# Patient Record
Sex: MALE | Race: BLACK OR AFRICAN AMERICAN | NOT HISPANIC OR LATINO | Employment: FULL TIME | URBAN - METROPOLITAN AREA
[De-identification: names, ages, dates, MRNs, and addresses within clinical notes are randomized per-mention and may not be internally consistent; named-entity substitution may affect disease eponyms.]

---

## 2019-01-19 ENCOUNTER — HOSPITAL ENCOUNTER (EMERGENCY)
Facility: MEDICAL CENTER | Age: 29
End: 2019-01-20
Attending: EMERGENCY MEDICINE
Payer: COMMERCIAL

## 2019-01-19 ENCOUNTER — APPOINTMENT (OUTPATIENT)
Dept: RADIOLOGY | Facility: MEDICAL CENTER | Age: 29
End: 2019-01-19
Attending: EMERGENCY MEDICINE
Payer: COMMERCIAL

## 2019-01-19 VITALS
HEIGHT: 74 IN | WEIGHT: 258.38 LBS | TEMPERATURE: 97.8 F | OXYGEN SATURATION: 98 % | HEART RATE: 63 BPM | SYSTOLIC BLOOD PRESSURE: 126 MMHG | DIASTOLIC BLOOD PRESSURE: 85 MMHG | BODY MASS INDEX: 33.16 KG/M2 | RESPIRATION RATE: 16 BRPM

## 2019-01-19 DIAGNOSIS — S61.210A LACERATION OF RIGHT INDEX FINGER WITHOUT FOREIGN BODY WITHOUT DAMAGE TO NAIL, INITIAL ENCOUNTER: ICD-10-CM

## 2019-01-19 DIAGNOSIS — S60.021A CONTUSION OF RIGHT INDEX FINGER WITHOUT DAMAGE TO NAIL, INITIAL ENCOUNTER: ICD-10-CM

## 2019-01-19 PROCEDURE — 90715 TDAP VACCINE 7 YRS/> IM: CPT | Performed by: EMERGENCY MEDICINE

## 2019-01-19 PROCEDURE — 304999 HCHG REPAIR-SIMPLE/INTERMED LEVEL 1

## 2019-01-19 PROCEDURE — A9270 NON-COVERED ITEM OR SERVICE: HCPCS | Performed by: EMERGENCY MEDICINE

## 2019-01-19 PROCEDURE — 303353 HCHG DERMABOND SKIN ADHESIVE

## 2019-01-19 PROCEDURE — 99284 EMERGENCY DEPT VISIT MOD MDM: CPT

## 2019-01-19 PROCEDURE — 90471 IMMUNIZATION ADMIN: CPT

## 2019-01-19 PROCEDURE — 73140 X-RAY EXAM OF FINGER(S): CPT | Mod: RT

## 2019-01-19 PROCEDURE — 700111 HCHG RX REV CODE 636 W/ 250 OVERRIDE (IP): Performed by: EMERGENCY MEDICINE

## 2019-01-19 PROCEDURE — 700102 HCHG RX REV CODE 250 W/ 637 OVERRIDE(OP): Performed by: EMERGENCY MEDICINE

## 2019-01-19 PROCEDURE — 302874 HCHG BANDAGE ACE 2 OR 3""

## 2019-01-19 RX ORDER — ACETAMINOPHEN 325 MG/1
650 TABLET ORAL ONCE
Status: COMPLETED | OUTPATIENT
Start: 2019-01-19 | End: 2019-01-19

## 2019-01-19 RX ADMIN — ACETAMINOPHEN 650 MG: 325 TABLET, FILM COATED ORAL at 22:42

## 2019-01-19 RX ADMIN — CLOSTRIDIUM TETANI TOXOID ANTIGEN (FORMALDEHYDE INACTIVATED), CORYNEBACTERIUM DIPHTHERIAE TOXOID ANTIGEN (FORMALDEHYDE INACTIVATED), BORDETELLA PERTUSSIS TOXOID ANTIGEN (GLUTARALDEHYDE INACTIVATED), BORDETELLA PERTUSSIS FILAMENTOUS HEMAGGLUTININ ANTIGEN (FORMALDEHYDE INACTIVATED), BORDETELLA PERTUSSIS PERTACTIN ANTIGEN, AND BORDETELLA PERTUSSIS FIMBRIAE 2/3 ANTIGEN 0.5 ML: 5; 2; 2.5; 5; 3; 5 INJECTION, SUSPENSION INTRAMUSCULAR at 22:42

## 2019-01-19 NOTE — LETTER
"  FORM C-4:  EMPLOYEE’S CLAIM FOR COMPENSATION/ REPORT OF INITIAL TREATMENT  EMPLOYEE’S CLAIM - PROVIDE ALL INFORMATION REQUESTED   First Name  Hason Last Name  Iman Birthdate             Age  1990 28 y.o. 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 [[address]] Zip  [address]   Date of Injury  1/19/2019       Hour of Injury  9:30 PM Date Employer Notified  1/19/2019 Last Day of Work after Injury or Occupational Disease  1/19/2019 Supervisor to Whom Injury Reported  HARRY   Address or Location of Accident (if applicable)     What were you doing at the time of accident? (if applicable)  WORKING    How did this injury or occupational disease occur? Be specific and answer in detail. Use additional sheet if necessary)  FINGER HAND GOT SLAMMED INTO BATTERY CELL AS i WAS PUSHING IT.   If you believe that you have an occupational disease, when did you first have knowledge of the disability and it relationship to your employment?  N/A Witnesses to the Accident  M/A     Nature of Injury or Occupational Disease  Laceration  Part(s) of Body Injured or Affected  Hand (R), Finger (R), Finger (R)    I certify that the above is true and correct to the best of my knowledge and that I have provided this information in order to obtain the benefits of [place] " [place]  I hereby authorize any physician, chiropractor, surgeon, practitioner, or other person, any hospital, including [facility] hospital, any medical service organization, any insurance company, or other institution or organization to release to each other, any medical or other information, including benefits paid or payable, pertinent to this injury or disease, except information relative to diagnosis, treatment and/or counseling for AIDS, psychological conditions, alcohol or controlled substances, for which I must give specific authorization.  A Photostat of this authorization shall be as valid as the original.   Date  01/20/2019 Place  [facility] Employee’s Signature   THIS REPORT MUST BE COMPLETED AND MAILED WITHIN 3 WORKING DAYS OF TREATMENT   Place  [facility], EMERGENCY DEPT  Name of Facility   [facility]   Date  1/19/2019 Diagnosis  (S61.210A) Laceration of right index finger without foreign body without damage to nail, initial encounter, Acute  (S60.021A) Contusion of right index finger without damage to nail, initial encounter, Acute Is there evidence the injured employee was under the influence of alcohol and/or another controlled substance at the time of accident?   Hour  12:12 AM Description of Injury or Disease  Laceration of right index finger without foreign body without damage to nail, initial encounter  Contusion of right index finger without damage to nail, initial encounter No   Treatment  Laceration repair  Have you advised the patient to remain off work five days or more?         No   X-Ray Findings  Negative   If Yes   From Date    To Date      From information given by the employee, together with medical evidence, can you directly connect this injury or occupational disease as job incurred?  Yes If No, is the employee capable of: Full Duty  No Modified Duty  Yes    "  Is additional medical care by a physician indicated?  Yes  Comments:Please follow-up with occupational health If Modified Duty, Specify any Limitations / Restrictions  Limited use of right hand     Do you know of any previous injury or disease contributing to this condition or occupational disease?  No   Date  1/20/2019 Print Doctor’s Name  Beverley Alcantar certify the employer’s copy of this form was mailed on:   Address  [address] [place] [id number] [phone] Insurer’s Use Only   [facility]  [id number]    Provider’s Tax ID Number  [id number] Telephone  Dept: [phone]    Doctor’s Signature  e-BEVERLEY Archibald D.O. Degree   MD    Original - TREATING PHYSICIAN OR CHIROPRACTOR   Pg 2-Insurer/TPA   Pg 3-Employer   Pg 4-Employee                                                                                                  Form C-4 (rev01/03)     BRIEF DESCRIPTION OF RIGHTS AND BENEFITS  (Pursuant to [place]RS 616C.050)    Notice of Injury or Occupational Disease (Incident Report Form C-1): If an injury or occupational disease (OD) arises out of and in the course of employment, you must provide written notice to your employer as soon as practicable, but no later than 7 days after the accident or OD. Your employer shall maintain a sufficient supply of the required forms.    Claim for Compensation (Form C-4): If medical treatment is sought, the form C-4 is available at the place of initial treatment. A completed \"Claim for Compensation\" (Form C-4) must be filed within 90 days after an accident or OD. The treating physician or chiropractor must, within 3 working days after treatment, complete and mail to the employer, the employer's insurer and third-party , the Claim for Compensation.    Medical Treatment: If you require medical treatment for your on-the-job injury or OD, you may be required to select a physician or chiropractor from a list provided by your workers’ compensation " insurer, if it has contracted with an Organization for Managed Care (MCO) or Preferred Provider Organization (PPO) or providers of health care. If your employer has not entered into a contract with an MCO or PPO, you may select a physician or chiropractor from the Panel of Physicians and Chiropractors. Any medical costs related to your industrial injury or OD will be paid by your insurer.    Temporary Total Disability (TTD): If your doctor has certified that you are unable to work for a period of at least 5 consecutive days, or 5 cumulative days in a 20-day period, or places restrictions on you that your employer does not accommodate, you may be entitled to TTD compensation.    Temporary Partial Disability (TPD): If the wage you receive upon reemployment is less than the compensation for TTD to which you are entitled, the insurer may be required to pay you TPD compensation to make up the difference. TPD can only be paid for a maximum of 24 months.    Permanent Partial Disability (PPD): When your medical condition is stable and there is an indication of a PPD as a result of your injury or OD, within 30 days, your insurer must arrange for an evaluation by a rating physician or chiropractor to determine the degree of your PPD. The amount of your PPD award depends on the date of injury, the results of the PPD evaluation and your age and wage.    Permanent Total Disability (PTD): If you are medically certified by a treating physician or chiropractor as permanently and totally disabled and have been granted a PTD status by your insurer, you are entitled to receive monthly benefits not to exceed 66 2/3% of your average monthly wage. The amount of your PTD payments is subject to reduction if you previously received a PPD award.    Vocational Rehabilitation Services: You may be eligible for vocational rehabilitation services if you are unable to return to the job due to a permanent physical impairment or permanent  restrictions as a result of your injury or occupational disease.    Transportation and Per Pancho Reimbursement: You may be eligible for travel expenses and per pancho associated with medical treatment.  Reopening: You may be able to reopen your claim if your condition worsens after claim closure.    Appeal Process: If you disagree with a written determination issued by the insurer or the insurer does not respond to your request, you may appeal to the Department of Administration, , by following the instructions contained in your determination letter. You must appeal the determination within 70 days from the date of the determination letter at [address], [address], [place], [place] [address], or [address], [address], [place], [place] [address]. If you disagree with the  decision, you may appeal to the Department of Administration, . You must file your appeal within 30 days from the date of the  decision letter at [address], [address], [place], [place] [address], or [address], [address], [place], [place] [address]. If you disagree with a decision of an , you may file a petition for judicial review with the District Court. You must do so within 30 days of the Appeal Officer’s decision. You may be represented by an  at your own expense or you may contact the [facility] for possible representation.    [place]  for Injured Workers (NAIW): If you disagree with a  decision, you may request that NAIW represent you without charge at an  Hearing. For information regarding denial of benefits, you may contact the [facility] at: [address], [address], [place] [address], [phone], or [address], [address] [address], [phone]    To File a Complaint with the Division: If you wish to file a complaint with the  of the Division of  Industrial Relations (DIR), please contact the Workers’ Compensation Section, [address], [address], [place], [place] [address], telephone [phone]) [phone], or [address], [address], [place], [place] [address], telephone [phone].    For assistance with Workers’ Compensation Issues: you may contact the Office of the [facility] Consumer Health Assistance, [address] [place] [address], [address], [place], [place] [address], Toll Free 1-656.215.2926, Web site: http://govTP [employer].[facility].[place]., E-mail [email or web address].                                                                                                                                                                               __________________________________________________________________                                    _________________            Employee Name / Signature                                                                                                                            Date                                       D-2 (rev. 10/07)

## 2019-01-20 NOTE — ED TRIAGE NOTES
"Chief Complaint   Patient presents with   • Hand Laceration     Was working with machinery at [employer], had a large weight pushed into his finger, sustained approx 1 cm lac to R 2nd finger.      /79   Pulse 63   Temp 37.3 °C (99.1 °F) (Temporal)   Resp 16   Ht 1.88 m (6' 2\")   Wt 117.2 kg (258 lb 6.1 oz)   SpO2 98%   BMI 33.17 kg/m²     Pt ambulatory to triage for above, steady on feet. Bleeding controlled at this time, dressing re-applied in triage. Pt thinks he has had a tetanus shot in the last 5 years but is not sure. Returned to [facility], instructed to notify staff of worsening concerns.   "

## 2019-01-20 NOTE — ED PROVIDER NOTES
"ED Provider Note    CHIEF COMPLAINT  Chief Complaint   Patient presents with   • Hand Laceration     Was working with machinery at [employer], had a large weight pushed into his finger, sustained approx 1 cm lac to R 2nd finger.         HPI    Primary care provider: No primary care provider on file.   History obtained from: Patient  History limited by: None     Nancy Valerio is a 28 y.o. male who presents to the ED complaining of injury to right index finger while working at [employer] shortly prior to arrival when there was a large weight accidentally pushed onto his finger.  He denies any other injuries except to the right index finger.  He is left-hand dominant.  Patient is unsure when he last had a tetanus shot.  He denies any weakness or sensory change.    REVIEW OF SYSTEMS  Please see HPI for pertinent positives/negatives.     PAST MEDICAL HISTORY  No past medical history on file.     SURGICAL HISTORY  History reviewed. No pertinent surgical history.     SOCIAL HISTORY  Social History     Social History Main Topics   • Smoking status: Never Smoker   • Smokeless tobacco: Never Used   • Alcohol use Yes      Comment: socially   • Drug use: No   • Sexual activity: Not on file        FAMILY HISTORY  History reviewed. No pertinent family history.     CURRENT MEDICATIONS  Home Medications    **Home medications have not yet been reviewed for this encounter**          ALLERGIES  No Known Allergies     PHYSICAL EXAM  VITAL SIGNS: /85   Pulse 63   Temp 36.6 °C (97.8 °F) (Temporal)   Resp 16   Ht 1.88 m (6' 2\")   Wt 117.2 kg (258 lb 6.1 oz)   SpO2 98%   BMI 33.17 kg/m²  @MARK[[id number]::@     Pulse ox interpretation: 98% I interpret this pulse ox as normal     Constitutional: Well developed, well nourished, alert in no apparent distress, nontoxic appearance   HENT: No external signs of trauma, normocephalic   Eyes: No discharge, no icterus   Neck: Trachea midline, no stridor   Cardiovascular: Strong distal pulses and good " perfusion   Thorax & Lungs: No respiratory distress   Extremities: No cyanosis, no edema, no gross deformity, good ROM, laceration to flexor surface of right index finger distal phalanx without gross deformity/swelling, mild ecchymosis and tenderness to palpation, brisk distal cap refill, sensation intact to touch throughout, 5/5 strength including against resistance  Skin: Warm, dry, no pallor/cyanosis, no rash noted   Neuro: A/O times 3, no focal deficits noted   Psychiatric: Cooperative, normal mood and affect, normal judgement, appropriate for clinical situation          DIAGNOSTIC STUDIES / PROCEDURES    Verbal consent was obtained for laceration repair.  The wound was irrigated with NS.  Wound was explored without evidence of FB.  The laceration was closed using Dermabond.  Pt tolerated procedure well without complications.  Pt instructed on S/S of infection.    Total repaired wound length: 2 cm.      Tetanus updated        LABS  All labs reviewed by me.     No results found for this or any previous visit.     RADIOLOGY  The radiologist's interpretation of all radiological studies have been reviewed by me.     DX-FINGER(S) 2+ RIGHT   Final Result      No foreign body or displaced fracture is detected             COURSE & MEDICAL DECISION MAKING  Nursing notes, VS, PMSFHx reviewed in chart.     Review of past medical records shows the patient without prior visits to this ED.      Differential diagnoses considered include but are not limited to: Fx, dislocation, laceration, contusion, strain, sprain, neurovascular injury       History and physical exam as above.  X-rays of the right index finger without evidence of acute bony injury.  Patient's tetanus was updated.  He has a superficial flap wound to his right index finger that was cleaned and closed using Dermabond without complication.  No foreign body identified and no obvious signs of tendon or joint involvement.  Discussed with patient monitoring for signs  and symptoms of infection and return to ED precautions were given.  He was advised to follow-up with occupational health.  Patient otherwise noted to be in no acute distress and nontoxic in appearance.  He verbalized understanding and agreed with plan of care with no further questions or concerns.      The patient is referred to a primary physician for blood pressure management, diabetic screening, and for all other preventative health concerns.       FINAL IMPRESSION  1. Laceration of right index finger without foreign body without damage to nail, initial encounter Acute   2. Contusion of right index finger without damage to nail, initial encounter Acute          DISPOSITION  Patient will be discharged home in stable condition.       FOLLOW UP  [facility]  [address] [place] [address]  [phone]    In 1 day      [facility], Emergency Dept  [address] [facility] [address] [address] [phone]    If symptoms worsen         OUTPATIENT MEDICATIONS  There are no discharge medications for this patient.         Electronically signed by: Yannick Alcantar, 1/19/2019 10:31 PM      Portions of this record were made with voice recognition software.  Despite my review, spelling/grammar/context errors may still remain.  Interpretation of this chart should be taken in this context.

## 2021-02-01 ENCOUNTER — HOSPITAL ENCOUNTER (OUTPATIENT)
Facility: MEDICAL CENTER | Age: 31
End: 2021-02-01
Attending: FAMILY MEDICINE
Payer: COMMERCIAL

## 2021-02-01 ENCOUNTER — OFFICE VISIT (OUTPATIENT)
Dept: URGENT CARE | Facility: PHYSICIAN GROUP | Age: 31
End: 2021-02-01
Payer: COMMERCIAL

## 2021-02-01 VITALS
TEMPERATURE: 98.4 F | WEIGHT: 229.2 LBS | DIASTOLIC BLOOD PRESSURE: 78 MMHG | BODY MASS INDEX: 28.5 KG/M2 | HEART RATE: 64 BPM | OXYGEN SATURATION: 98 % | SYSTOLIC BLOOD PRESSURE: 122 MMHG | RESPIRATION RATE: 12 BRPM | HEIGHT: 75 IN

## 2021-02-01 DIAGNOSIS — J98.8 RTI (RESPIRATORY TRACT INFECTION): ICD-10-CM

## 2021-02-01 LAB
INT CON NEG: NEGATIVE
INT CON POS: POSITIVE
S PYO AG THROAT QL: NORMAL

## 2021-02-01 PROCEDURE — 87880 STREP A ASSAY W/OPTIC: CPT | Performed by: FAMILY MEDICINE

## 2021-02-01 PROCEDURE — U0003 INFECTIOUS AGENT DETECTION BY NUCLEIC ACID (DNA OR RNA); SEVERE ACUTE RESPIRATORY SYNDROME CORONAVIRUS 2 (SARS-COV-2) (CORONAVIRUS DISEASE [COVID-19]), AMPLIFIED PROBE TECHNIQUE, MAKING USE OF HIGH THROUGHPUT TECHNOLOGIES AS DESCRIBED BY CMS-2020-01-R: HCPCS

## 2021-02-01 PROCEDURE — U0005 INFEC AGEN DETEC AMPLI PROBE: HCPCS

## 2021-02-01 PROCEDURE — 99202 OFFICE O/P NEW SF 15 MIN: CPT | Performed by: FAMILY MEDICINE

## 2021-02-01 ASSESSMENT — ENCOUNTER SYMPTOMS
SORE THROAT: 1
HEADACHES: 1

## 2021-02-01 NOTE — PROGRESS NOTES
"Subjective:      Nancy Valerio is a 30 y.o. male who presents with Pharyngitis (exp to COVID, no smell or taste, sore throat, previous fever, headache)    - This is a pleasant and nontoxic appearing 30 y.o. male with c/o ~6 days ago had some headaches, malaise, subjective fever. Has improved but 2 days ago has lost his sense of smell and taste so came in to get tested for covid-19. No cp/sob or fever in past 2-3 days            ALLERGIES:  Patient has no known allergies.     PMH:  History reviewed. No pertinent past medical history.     PSH:  History reviewed. No pertinent surgical history.    MEDS:  No current outpatient medications on file.    ** I have documented what I find to be significant in regards to past medical, social, family and surgical history  in my HPI or under PMH/PSH/FH review section, otherwise it is noncontributory **             HPI    Review of Systems   Constitutional: Positive for malaise/fatigue.   HENT: Positive for sore throat.    Neurological: Positive for headaches.   All other systems reviewed and are negative.         Objective:     /78 (BP Location: Left arm, Patient Position: Sitting, BP Cuff Size: Adult)   Pulse 64   Temp 36.9 °C (98.4 °F) (Temporal)   Resp 12   Ht 1.905 m (6' 3\")   Wt 104 kg (229 lb 3.2 oz)   SpO2 98%   BMI 28.65 kg/m²      Physical Exam  Vitals signs and nursing note reviewed.   Constitutional:       General: He is not in acute distress.     Appearance: He is well-developed. He is not diaphoretic.   HENT:      Head: Normocephalic and atraumatic.      Mouth/Throat:      Mouth: Mucous membranes are moist.      Pharynx: Oropharynx is clear.   Eyes:      General: No scleral icterus.     Conjunctiva/sclera: Conjunctivae normal.   Cardiovascular:      Heart sounds: Normal heart sounds. No murmur.   Pulmonary:      Effort: Pulmonary effort is normal. No respiratory distress.      Breath sounds: Normal breath sounds.   Skin:     Coloration: Skin is not pale. "      Findings: No rash.   Neurological:      Mental Status: He is alert.      Motor: No abnormal muscle tone.   Psychiatric:         Mood and Affect: Mood normal.         Behavior: Behavior normal.         Judgment: Judgment normal.                 Assessment/Plan:            1. RTI (respiratory tract infection)  POCT Rapid Strep A    COVID/SARS CoV-2 PCR         - Dx, plan & d/c instructions discussed w/ patient and/or family members  - Rest, stay hydrated OTC Motrin and/or Tylenol as needed  - E.R. precautions discussed       Follow up with their PCP in 2-3 days strongly advised, ER if not improving or feeling/getting worse.    Any realistic side effects of medications that may have been given today (i.e. Rash, GI upset/constipation, sedation, elevation of BP or blood sugars) discussed.     Patient left in stable condition      reviewed if narcotics given

## 2021-02-02 LAB
COVID ORDER STATUS COVID19: NORMAL
SARS-COV-2 RNA RESP QL NAA+PROBE: DETECTED
SPECIMEN SOURCE: ABNORMAL

## 2021-03-22 ENCOUNTER — APPOINTMENT (OUTPATIENT)
Dept: URGENT CARE | Facility: PHYSICIAN GROUP | Age: 31
End: 2021-03-22

## 2021-05-17 LAB — EKG IMPRESSION: NORMAL

## 2021-05-17 PROCEDURE — 302449 STATCHG TRIAGE ONLY (STATISTIC)

## 2021-05-17 PROCEDURE — 93005 ELECTROCARDIOGRAM TRACING: CPT

## 2021-05-18 ENCOUNTER — HOSPITAL ENCOUNTER (EMERGENCY)
Facility: MEDICAL CENTER | Age: 31
End: 2021-05-18
Payer: COMMERCIAL

## 2021-05-18 VITALS
SYSTOLIC BLOOD PRESSURE: 132 MMHG | BODY MASS INDEX: 28.75 KG/M2 | RESPIRATION RATE: 14 BRPM | TEMPERATURE: 97.5 F | HEART RATE: 90 BPM | WEIGHT: 230 LBS | DIASTOLIC BLOOD PRESSURE: 81 MMHG | OXYGEN SATURATION: 94 %

## 2021-05-18 NOTE — ED NOTES
Attempted to call pt to place in ED room and pt not seen in lobby or outside ED entrance at this time. Will reattempt shortly

## 2021-05-18 NOTE — ED TRIAGE NOTES
Nancy Valerio   30 y.o. male   Chief Complaint   Patient presents with   • Shortness of Breath      The patient presents to the ED via EMS to triage for evaluation of shortness of breath and a case of the hiccups. The patient reports that he was smoking marijuana when he suddenly developed the above mentioned symptoms. The patient reports that the symptoms resolved en route to the department. He states that he has a slight residual headache. Denies any other symptoms at this time.     The patient denies knowingly being around anyone with suspected or confirmed COVID 19.    /81   Pulse 90   Temp 36.4 °C (97.5 °F) (Temporal)   Resp 14   Wt 104 kg (230 lb)   SpO2 94%   BMI 28.75 kg/m²

## 2021-08-30 ENCOUNTER — APPOINTMENT (OUTPATIENT)
Dept: URGENT CARE | Facility: CLINIC | Age: 31
End: 2021-08-30
Payer: COMMERCIAL

## 2021-09-16 ENCOUNTER — HOSPITAL ENCOUNTER (EMERGENCY)
Facility: MEDICAL CENTER | Age: 31
End: 2021-09-16
Payer: COMMERCIAL

## 2021-09-16 VITALS
WEIGHT: 268.74 LBS | TEMPERATURE: 98.5 F | HEART RATE: 88 BPM | HEIGHT: 75 IN | DIASTOLIC BLOOD PRESSURE: 91 MMHG | BODY MASS INDEX: 33.41 KG/M2 | OXYGEN SATURATION: 98 % | SYSTOLIC BLOOD PRESSURE: 145 MMHG | RESPIRATION RATE: 16 BRPM

## 2021-09-16 PROCEDURE — 302449 STATCHG TRIAGE ONLY (STATISTIC)

## 2021-09-16 NOTE — ED TRIAGE NOTES
Nancy [facility]  30 y.o.  male  Chief Complaint   Patient presents with   • Drug Ingestion     Pt took LSD for the first time at 9PM (as well as a small amount of marijuana), had no effects so took a second tab at 11PM, started having visual hallucinations a few hours ago & they have since escalated to be ++ frightening. Tried taking a Nyquil to sleep with no relief. Pt denies any SI/HI, just afraid at this time to close his eyes. Pt calm, cooperative.        Patient educated on triage process, to alert staff if any changes in condition.

## 2021-09-16 NOTE — ED NOTES
Pt is heard calling an uber and getting into one. Did not notify staff of departure. Pt did not sign AMA form.

## 2022-01-31 ENCOUNTER — TELEPHONE (OUTPATIENT)
Dept: SCHEDULING | Facility: IMAGING CENTER | Age: 32
End: 2022-01-31

## 2022-02-16 ENCOUNTER — HOSPITAL ENCOUNTER (EMERGENCY)
Facility: MEDICAL CENTER | Age: 32
End: 2022-02-16
Payer: COMMERCIAL

## 2022-02-16 VITALS
SYSTOLIC BLOOD PRESSURE: 124 MMHG | HEART RATE: 64 BPM | DIASTOLIC BLOOD PRESSURE: 85 MMHG | WEIGHT: 227 LBS | HEIGHT: 75 IN | TEMPERATURE: 97.4 F | BODY MASS INDEX: 28.23 KG/M2 | RESPIRATION RATE: 20 BRPM | OXYGEN SATURATION: 97 %

## 2022-02-16 PROCEDURE — A9270 NON-COVERED ITEM OR SERVICE: HCPCS | Performed by: EMERGENCY MEDICINE

## 2022-02-16 PROCEDURE — 99283 EMERGENCY DEPT VISIT LOW MDM: CPT

## 2022-02-16 PROCEDURE — 700102 HCHG RX REV CODE 250 W/ 637 OVERRIDE(OP): Performed by: EMERGENCY MEDICINE

## 2022-02-16 RX ORDER — BENZOCAINE/MENTHOL 6 MG-10 MG
LOZENGE MUCOUS MEMBRANE 2 TIMES DAILY
Status: DISCONTINUED | OUTPATIENT
Start: 2022-02-16 | End: 2022-02-16

## 2022-02-16 RX ORDER — NYSTATIN 100000 [USP'U]/G
POWDER TOPICAL 2 TIMES DAILY
Status: DISCONTINUED | OUTPATIENT
Start: 2022-02-16 | End: 2022-02-16

## 2022-02-16 RX ORDER — NYSTATIN 100000 [USP'U]/G
POWDER TOPICAL 2 TIMES DAILY
Status: COMPLETED | OUTPATIENT
Start: 2022-02-16 | End: 2022-02-16

## 2022-02-16 RX ORDER — BENZOCAINE/MENTHOL 6 MG-10 MG
LOZENGE MUCOUS MEMBRANE ONCE
Status: COMPLETED | OUTPATIENT
Start: 2022-02-16 | End: 2022-02-16

## 2022-02-16 RX ADMIN — NYSTATIN: 100000 POWDER TOPICAL at 03:45

## 2022-02-16 RX ADMIN — HYDROCORTISONE: 10 CREAM TOPICAL at 03:30

## 2022-02-16 NOTE — ED PROVIDER NOTES
ER Provider Note     Scribed for No att. providers found by Kia Ng. 2/16/2022, 3:48 AM.    Primary Care Provider: No primary care provider.  Means of Arrival: Walk in    History obtained from: Patient  History limited by: None     CHIEF COMPLAINT  Chief Complaint   Patient presents with    Rash       HPI  Nancy Valerio is a 31 y.o. male who presents to the Emergency Department for evaluation of ankle and buttock rash. The patient reports that he first noticed the rash on his buttock on Friday while at work ([employer]). He realized that he was scratching his buttock area more than usual. As the days progressed, the rash became more itchy and irritated. Nancy rates the buttock rash pain a 6/10. The patient denies using any new perfume, detergent, soap, or shampoo. He has also not used cream or lotion on his buttock, however he applied rubbing alcohol in hopes of drying out the rash. But notes that this may have exacerbated it. He also tried soaking in bath water, containing dish detergent, to alleviate his buttock rash with no alleviation. A day before yesterday, the patient then noticed a bump on his ankle. He notes that his ankle rash is also painful and itchy especially with pressure. He denies hematochezia or dysuria. No alleviating factors were reported. The patient has not had any harmful exposure of his buttock or ankle. Nancy has no history of skin illnesses. The patient is currently not sexually active. However, he was a few months ago with a female partner and notes that he did use protection most of the time. He has not had an STD check in the last six months. The patient reports that he stopped smoking marijuana a few months ago. He is not vaccinated for COVID.     REVIEW OF SYSTEMS  See HPI for further details.   Pertinent positives include buttock rash and ankle rash. Pertinent negatives include no hematochezia or dysuria.      PAST MEDICAL HISTORY  None noted.     SURGICAL HISTORY  patient denies any  "surgical history    SOCIAL HISTORY      Social History     Substance and Sexual Activity   Drug Use None noted       FAMILY HISTORY  History reviewed. No pertinent family history.    CURRENT MEDICATIONS  None noted.     ALLERGIES  None noted.     PHYSICAL EXAM  VITAL SIGNS: /85   Pulse 64   Temp 36.3 °C (97.4 °F)   Resp 20   Ht 1.905 m (6' 3\")   Wt 103 kg (227 lb)   SpO2 97%   BMI 28.37 kg/m²      Constitutional: Alert in no apparent distress.  HENT: Normocephalic, Atraumatic, Bilateral external ears normal. Nose normal. Moist mucus membranes.   Eyes: Pupils are equal and reactive. Conjunctiva normal, non-icteric.   Heart: Regular rate and rythm, no murmurs.    Lungs: Clear to auscultation bilaterally.  Skin: Rash between buttock. Peeling and white discharge. Warm, No erythema. Fluid filled bump on top of right foot.   Neurologic: Alert, Grossly non-focal.   Psychiatric: Affect normal, Judgment normal, Mood normal, Appears appropriate and not intoxicated.        COURSE & MEDICAL DECISION MAKING  Pertinent Labs & Imaging studies reviewed. (See chart for details)    This is a 31 y.o. male that presents with what appears to be a fungal infection in his gluteal fold as well as a unclear rash on the left toe.  We will give the patient hydrocortisone cream.  We will send him home with these prescriptions..     3:48 AM - Patient seen and examined at bedside.      The patient will return for new or worsening symptoms and is stable at the time of discharge.    The patient is referred to a primary physician for blood pressure management, diabetic screening, and for all other preventative health concerns.      DISPOSITION:  Patient will be discharged home in stable condition.    FOLLOW UP:  No follow-up provider specified.      FINAL IMPRESSION  No diagnosis found.      IKia (Derrick), am scribing for, and in the presence of, No att. providers found.    Electronically signed by: Kia Ng (Derrick), " 2/16/2022    I, No att. providers found personally performed the services described in this documentation, as scribed by Kia Ng in my presence, and it is both accurate and complete.     The note accurately reflects work and decisions made by me.  Fernando Marin M.D.  2/16/2022  5:20 AM

## 2022-02-22 ENCOUNTER — TELEPHONE (OUTPATIENT)
Dept: SCHEDULING | Facility: IMAGING CENTER | Age: 32
End: 2022-02-22

## 2022-02-24 ENCOUNTER — APPOINTMENT (OUTPATIENT)
Dept: RADIOLOGY | Facility: MEDICAL CENTER | Age: 32
End: 2022-02-24
Attending: EMERGENCY MEDICINE
Payer: COMMERCIAL

## 2022-02-24 ENCOUNTER — HOSPITAL ENCOUNTER (EMERGENCY)
Facility: MEDICAL CENTER | Age: 32
End: 2022-02-24
Attending: EMERGENCY MEDICINE
Payer: COMMERCIAL

## 2022-02-24 VITALS
BODY MASS INDEX: 34.34 KG/M2 | SYSTOLIC BLOOD PRESSURE: 145 MMHG | DIASTOLIC BLOOD PRESSURE: 82 MMHG | TEMPERATURE: 98.5 F | WEIGHT: 253.53 LBS | RESPIRATION RATE: 16 BRPM | HEART RATE: 100 BPM | HEIGHT: 72 IN | OXYGEN SATURATION: 99 %

## 2022-02-24 DIAGNOSIS — R10.31 RLQ ABDOMINAL PAIN: ICD-10-CM

## 2022-02-24 DIAGNOSIS — M54.50 ACUTE RIGHT-SIDED LOW BACK PAIN WITHOUT SCIATICA: ICD-10-CM

## 2022-02-24 LAB
ALBUMIN SERPL BCP-MCNC: 4.8 G/DL (ref 3.2–4.9)
ALBUMIN/GLOB SERPL: 1.2 G/DL
ALP SERPL-CCNC: 79 U/L (ref 30–99)
ALT SERPL-CCNC: 24 U/L (ref 2–50)
ANION GAP SERPL CALC-SCNC: 12 MMOL/L (ref 7–16)
APPEARANCE UR: CLEAR
AST SERPL-CCNC: 29 U/L (ref 12–45)
BASOPHILS # BLD AUTO: 0.3 % (ref 0–1.8)
BASOPHILS # BLD: 0.05 K/UL (ref 0–0.12)
BILIRUB SERPL-MCNC: 1.4 MG/DL (ref 0.1–1.5)
BILIRUB UR QL STRIP.AUTO: NEGATIVE
BUN SERPL-MCNC: 16 MG/DL (ref 8–22)
CALCIUM SERPL-MCNC: 9.9 MG/DL (ref 8.5–10.5)
CHLORIDE SERPL-SCNC: 98 MMOL/L (ref 96–112)
CO2 SERPL-SCNC: 24 MMOL/L (ref 20–33)
COLOR UR: ABNORMAL
CREAT SERPL-MCNC: 1.05 MG/DL (ref 0.5–1.4)
EOSINOPHIL # BLD AUTO: 0.02 K/UL (ref 0–0.51)
EOSINOPHIL NFR BLD: 0.1 % (ref 0–6.9)
ERYTHROCYTE [DISTWIDTH] IN BLOOD BY AUTOMATED COUNT: 36.2 FL (ref 35.9–50)
GLOBULIN SER CALC-MCNC: 4 G/DL (ref 1.9–3.5)
GLUCOSE SERPL-MCNC: 136 MG/DL (ref 65–99)
GLUCOSE UR STRIP.AUTO-MCNC: NEGATIVE MG/DL
HCT VFR BLD AUTO: 41.5 % (ref 42–52)
HGB BLD-MCNC: 15.4 G/DL (ref 14–18)
IMM GRANULOCYTES # BLD AUTO: 0.08 K/UL (ref 0–0.11)
IMM GRANULOCYTES NFR BLD AUTO: 0.5 % (ref 0–0.9)
KETONES UR STRIP.AUTO-MCNC: ABNORMAL MG/DL
LEUKOCYTE ESTERASE UR QL STRIP.AUTO: NEGATIVE
LIPASE SERPL-CCNC: 44 U/L (ref 11–82)
LYMPHOCYTES # BLD AUTO: 1.43 K/UL (ref 1–4.8)
LYMPHOCYTES NFR BLD: 8.1 % (ref 22–41)
MCH RBC QN AUTO: 29.1 PG (ref 27–33)
MCHC RBC AUTO-ENTMCNC: 37.1 G/DL (ref 33.7–35.3)
MCV RBC AUTO: 78.3 FL (ref 81.4–97.8)
MICRO URNS: ABNORMAL
MONOCYTES # BLD AUTO: 1.08 K/UL (ref 0–0.85)
MONOCYTES NFR BLD AUTO: 6.1 % (ref 0–13.4)
NEUTROPHILS # BLD AUTO: 14.97 K/UL (ref 1.82–7.42)
NEUTROPHILS NFR BLD: 84.9 % (ref 44–72)
NITRITE UR QL STRIP.AUTO: NEGATIVE
NRBC # BLD AUTO: 0 K/UL
NRBC BLD-RTO: 0 /100 WBC
PH UR STRIP.AUTO: 5 [PH] (ref 5–8)
PLATELET # BLD AUTO: 302 K/UL (ref 164–446)
PMV BLD AUTO: 11.2 FL (ref 9–12.9)
POTASSIUM SERPL-SCNC: 3.9 MMOL/L (ref 3.6–5.5)
PROT SERPL-MCNC: 8.8 G/DL (ref 6–8.2)
PROT UR QL STRIP: NEGATIVE MG/DL
RBC # BLD AUTO: 5.3 M/UL (ref 4.7–6.1)
RBC UR QL AUTO: NEGATIVE
SODIUM SERPL-SCNC: 134 MMOL/L (ref 135–145)
SP GR UR STRIP.AUTO: 1.03
UROBILINOGEN UR STRIP.AUTO-MCNC: 0.2 MG/DL
WBC # BLD AUTO: 17.6 K/UL (ref 4.8–10.8)

## 2022-02-24 PROCEDURE — 96374 THER/PROPH/DIAG INJ IV PUSH: CPT

## 2022-02-24 PROCEDURE — 83690 ASSAY OF LIPASE: CPT

## 2022-02-24 PROCEDURE — 80053 COMPREHEN METABOLIC PANEL: CPT

## 2022-02-24 PROCEDURE — 700111 HCHG RX REV CODE 636 W/ 250 OVERRIDE (IP): Performed by: EMERGENCY MEDICINE

## 2022-02-24 PROCEDURE — 74177 CT ABD & PELVIS W/CONTRAST: CPT

## 2022-02-24 PROCEDURE — 36415 COLL VENOUS BLD VENIPUNCTURE: CPT

## 2022-02-24 PROCEDURE — 99285 EMERGENCY DEPT VISIT HI MDM: CPT

## 2022-02-24 PROCEDURE — 700117 HCHG RX CONTRAST REV CODE 255: Performed by: EMERGENCY MEDICINE

## 2022-02-24 PROCEDURE — 85025 COMPLETE CBC W/AUTO DIFF WBC: CPT

## 2022-02-24 PROCEDURE — 81003 URINALYSIS AUTO W/O SCOPE: CPT

## 2022-02-24 PROCEDURE — 700105 HCHG RX REV CODE 258: Performed by: EMERGENCY MEDICINE

## 2022-02-24 RX ORDER — MORPHINE SULFATE 4 MG/ML
6 INJECTION INTRAVENOUS ONCE
Status: COMPLETED | OUTPATIENT
Start: 2022-02-24 | End: 2022-02-24

## 2022-02-24 RX ORDER — SODIUM CHLORIDE 9 MG/ML
1000 INJECTION, SOLUTION INTRAVENOUS ONCE
Status: COMPLETED | OUTPATIENT
Start: 2022-02-24 | End: 2022-02-24

## 2022-02-24 RX ADMIN — IOHEXOL 100 ML: 350 INJECTION, SOLUTION INTRAVENOUS at 11:20

## 2022-02-24 RX ADMIN — SODIUM CHLORIDE 1000 ML: 9 INJECTION, SOLUTION INTRAVENOUS at 11:14

## 2022-02-24 RX ADMIN — MORPHINE SULFATE 6 MG: 4 INJECTION INTRAVENOUS at 11:14

## 2022-02-24 ASSESSMENT — LIFESTYLE VARIABLES: DO YOU DRINK ALCOHOL: NO

## 2022-02-24 NOTE — ED TRIAGE NOTES
Pt amb to triage.  Chief Complaint   Patient presents with   • Head Ache   • RLQ Pain   • Flank Pain     Symptoms since last night. Also states he had a fever at home but was unable to check his temp. Protocol initiated.

## 2022-02-24 NOTE — ED PROVIDER NOTES
ED Provider Note    CHIEF COMPLAINT  Chief Complaint   Patient presents with   • Head Ache   • RLQ Pain   • Flank Pain       HPI  Nancy Valerio is a 31 y.o. male who presents for evaluation of right lower quadrant abdominal pain with decreased appetite.  He also reports having a headache.  This all began yesterday, states the pain seems to radiate to his low back on the right side.  No burning or blood with urination.  No vomiting, no diarrhea, he felt chills and subjective fever with this.  He has no medical problems.  He states that he does occasionally get headaches.  No head injuries.  Takes no medicine on a regular basis.  The patient reports he is specifically concerned about cancer after a Google search.    REVIEW OF SYSTEMS  Negative for rash, chest pain, dyspnea, vomiting, diarrhea, focal weakness, focal numbness, focal tingling. All other systems are negative.     PAST MEDICAL HISTORY  No past medical history on file.    FAMILY HISTORY  No family history on file.    SOCIAL HISTORY  Social History     Tobacco Use   • Smoking status: Never Smoker   • Smokeless tobacco: Never Used   Vaping Use   • Vaping Use: Never used   Substance Use Topics   • Alcohol use: Yes     Comment: socially   • Drug use: Not Currently     Comment: occasional marijuana       SURGICAL HISTORY  No past surgical history on file.    CURRENT MEDICATIONS  I personally reviewed the medication list in the charting documentation.     ALLERGIES  No Known Allergies    MEDICAL RECORD  I have reviewed patient's medical record and pertinent results are listed above.      PHYSICAL EXAM  VITAL SIGNS: /85   Pulse 100   Temp 37.2 °C (99 °F) (Temporal)   Resp 18   Ht 1.829 m (6')   Wt 115 kg (253 lb 8.5 oz)   SpO2 100%   BMI 34.38 kg/m²    Constitutional: Well appearing patient in no acute distress.  Awake and alert, not toxic nor ill in appearance.  HENT: Normocephalic, no obvious evidence of acute trauma.  Eyes: No scleral  icterus. Normal conjunctiva pupils are equal and reactive.  Neck: Comfortable movement without any obvious restriction in the range of motion.  Cardiovascular: Upon ascultation I appreciate a regular heart rhythm and a normal rate.   Thorax & Lungs: Normal nonlabored respirations.  Upon application of the stethoscope for auscultation I find there to be no associated chest wall tenderness.  I appreciate no wheezing, rhonchi or rales. There is normal air movement.    Abdomen: The abdomen is not visibly distended.  Upon palpation has mild focal tenderness in the right lower quadrant but no rebound, no guarding.  Skin: The exposed portions of skin reveal no obvious rash or other abnormalities.  Neurologic: Alert & oriented. No focal deficits observed.   Psychiatric: Normal affect appropriate for the clinical situation.    DIAGNOSTIC STUDIES / PROCEDURES    LABS/EKGs  Results for orders placed or performed during the hospital encounter of 02/24/22   CBC WITH DIFFERENTIAL   Result Value Ref Range    WBC 17.6 (H) 4.8 - 10.8 K/uL    RBC 5.30 4.70 - 6.10 M/uL    Hemoglobin 15.4 14.0 - 18.0 g/dL    Hematocrit 41.5 (L) 42.0 - 52.0 %    MCV 78.3 (L) 81.4 - 97.8 fL    MCH 29.1 27.0 - 33.0 pg    MCHC 37.1 (H) 33.7 - 35.3 g/dL    RDW 36.2 35.9 - 50.0 fL    Platelet Count 302 164 - 446 K/uL    MPV 11.2 9.0 - 12.9 fL    Neutrophils-Polys 84.90 (H) 44.00 - 72.00 %    Lymphocytes 8.10 (L) 22.00 - 41.00 %    Monocytes 6.10 0.00 - 13.40 %    Eosinophils 0.10 0.00 - 6.90 %    Basophils 0.30 0.00 - 1.80 %    Immature Granulocytes 0.50 0.00 - 0.90 %    Nucleated RBC 0.00 /100 WBC    Neutrophils (Absolute) 14.97 (H) 1.82 - 7.42 K/uL    Lymphs (Absolute) 1.43 1.00 - 4.80 K/uL    Monos (Absolute) 1.08 (H) 0.00 - 0.85 K/uL    Eos (Absolute) 0.02 0.00 - 0.51 K/uL    Baso (Absolute) 0.05 0.00 - 0.12 K/uL    Immature Granulocytes (abs) 0.08 0.00 - 0.11 K/uL    NRBC (Absolute) 0.00 K/uL   COMP METABOLIC PANEL   Result Value Ref Range    Sodium 134  (L) 135 - 145 mmol/L    Potassium 3.9 3.6 - 5.5 mmol/L    Chloride 98 96 - 112 mmol/L    Co2 24 20 - 33 mmol/L    Anion Gap 12.0 7.0 - 16.0    Glucose 136 (H) 65 - 99 mg/dL    Bun 16 8 - 22 mg/dL    Creatinine 1.05 0.50 - 1.40 mg/dL    Calcium 9.9 8.5 - 10.5 mg/dL    AST(SGOT) 29 12 - 45 U/L    ALT(SGPT) 24 2 - 50 U/L    Alkaline Phosphatase 79 30 - 99 U/L    Total Bilirubin 1.4 0.1 - 1.5 mg/dL    Albumin 4.8 3.2 - 4.9 g/dL    Total Protein 8.8 (H) 6.0 - 8.2 g/dL    Globulin 4.0 (H) 1.9 - 3.5 g/dL    A-G Ratio 1.2 g/dL   LIPASE   Result Value Ref Range    Lipase 44 11 - 82 U/L   URINALYSIS    Specimen: Urine   Result Value Ref Range    Color DK Yellow     Character Clear     Specific Gravity 1.033 <1.035    Ph 5.0 5.0 - 8.0    Glucose Negative Negative mg/dL    Ketones Trace (A) Negative mg/dL    Protein Negative Negative mg/dL    Bilirubin Negative Negative    Urobilinogen, Urine 0.2 Negative    Nitrite Negative Negative    Leukocyte Esterase Negative Negative    Occult Blood Negative Negative    Micro Urine Req see below    ESTIMATED GFR   Result Value Ref Range    GFR If African American >60 >60 mL/min/1.73 m 2    GFR If Non African American >60 >60 mL/min/1.73 m 2        RADIOLOGY  CT-ABDOMEN-PELVIS WITH   Final Result      No acute abnormality. Normal appendix.            COURSE & MEDICAL DECISION MAKING  I have reviewed any medical record information, laboratory studies and radiographic results as noted above.  Differential diagnoses includes: Acute appendicitis, urinary tract infection, dehydration, electrolyte abnormalities, anemia, biliary suction, pancreatitis, hepatitis    Encounter Summary: This is a very pleasant 31 y.o. male who unfortunately required evaluation in the emergency department today with abdominal pain specifically in the right lower quadrant associated with decreased appetite over the past 2 days, he also has a headache with this.  Felt subjective fever and chills.  On exam he is focal  but mild tenderness in the right lower quadrant.  His triage ordered blood work reveals a significant leukocytosis over 17,000.  His blood work and urinalysis are otherwise unremarkable.  We will treat him with some morphine for his discomfort, will obtain a CT scan to evaluate for the possibility of appendicitis ------- CT scan is very reassuring with no acute abnormality appreciated, and normal appendix is identified.  At this point patient is feeling better.  Strict return instructions have been discussed and he is being discharged home in stable condition      DISPOSITION: Discharged home in stable condition      FINAL IMPRESSION  1. RLQ abdominal pain    2. Acute right-sided low back pain without sciatica           This dictation was created using voice recognition software. The accuracy of the dictation is limited to the abilities of the software. I expect there may be some errors of grammar and possibly content. The nursing notes were reviewed and certain aspects of this information were incorporated into this note.    Electronically signed by: Blake Price M.D., 2/24/2022 10:31 AM

## 2022-03-09 ENCOUNTER — TELEPHONE (OUTPATIENT)
Dept: SCHEDULING | Facility: IMAGING CENTER | Age: 32
End: 2022-03-09

## 2022-05-14 ENCOUNTER — HOSPITAL ENCOUNTER (EMERGENCY)
Facility: MEDICAL CENTER | Age: 32
End: 2022-05-14
Attending: EMERGENCY MEDICINE
Payer: COMMERCIAL

## 2022-05-14 VITALS
RESPIRATION RATE: 16 BRPM | DIASTOLIC BLOOD PRESSURE: 90 MMHG | BODY MASS INDEX: 34.32 KG/M2 | HEART RATE: 60 BPM | TEMPERATURE: 98.2 F | SYSTOLIC BLOOD PRESSURE: 150 MMHG | HEIGHT: 75 IN | OXYGEN SATURATION: 98 % | WEIGHT: 276.02 LBS

## 2022-05-14 DIAGNOSIS — K08.89 PAIN, DENTAL: ICD-10-CM

## 2022-05-14 PROCEDURE — 64400 NJX AA&/STRD TRIGEMINAL NRV: CPT

## 2022-05-14 PROCEDURE — 99281 EMR DPT VST MAYX REQ PHY/QHP: CPT

## 2022-05-14 PROCEDURE — 700101 HCHG RX REV CODE 250: Performed by: EMERGENCY MEDICINE

## 2022-05-14 RX ORDER — IBUPROFEN 800 MG/1
800 TABLET ORAL EVERY 8 HOURS PRN
Qty: 30 TABLET | Refills: 0 | Status: SHIPPED | OUTPATIENT
Start: 2022-05-14 | End: 2022-05-21

## 2022-05-14 RX ORDER — AMOXICILLIN 500 MG/1
500 CAPSULE ORAL 3 TIMES DAILY
Qty: 21 CAPSULE | Refills: 0 | Status: SHIPPED | OUTPATIENT
Start: 2022-05-14 | End: 2022-05-21

## 2022-05-14 RX ORDER — BUPIVACAINE HYDROCHLORIDE AND EPINEPHRINE 5; 5 MG/ML; UG/ML
10 INJECTION, SOLUTION EPIDURAL; INTRACAUDAL; PERINEURAL ONCE
Status: COMPLETED | OUTPATIENT
Start: 2022-05-14 | End: 2022-05-14

## 2022-05-14 RX ADMIN — BUPIVACAINE HYDROCHLORIDE AND EPINEPHRINE 10 ML: 5; 5 INJECTION, SOLUTION EPIDURAL; INTRACAUDAL; PERINEURAL at 06:51

## 2022-05-14 ASSESSMENT — FIBROSIS 4 INDEX: FIB4 SCORE: 0.63

## 2022-05-14 NOTE — ED PROVIDER NOTES
"ED Provider Note    CHIEF COMPLAINT  Chief Complaint   Patient presents with   • Tooth Ache     Pt reports L lower tooth ache. Pt has dentist apt on Wednesday, but states he can't wait that long. Pt has been taking ibuprofen and Tylenol with no relief         HPI    Primary care provider: Edy Eubanks D.O.   History obtained from: Patient  History limited by: None     Nancy Valerio is a 31 y.o. male who presents to the ED complaining of left lower wisdom tooth pain that started \"earlier this week.\"  He states that he made appointment with dentist but it is not until this Wednesday.  He has been treating it with acetaminophen and ibuprofen without improvement and the pain became too severe so he came to the ED.  He denies fever/chills/difficulty swallowing/difficulty breathing/nausea/vomiting or other symptoms.  He denies any past medical problems.    REVIEW OF SYSTEMS  Please see HPI for pertinent positives/negatives.  All other systems reviewed and are negative.     PAST MEDICAL HISTORY  No past medical history on file.     SURGICAL HISTORY  History reviewed. No pertinent surgical history.     SOCIAL HISTORY  Social History     Tobacco Use   • Smoking status: Never Smoker   • Smokeless tobacco: Never Used   Vaping Use   • Vaping Use: Never used   Substance and Sexual Activity   • Alcohol use: Yes     Comment: socially   • Drug use: Yes     Comment: occasional marijuana   • Sexual activity: Not on file        FAMILY HISTORY  History reviewed. No pertinent family history.     CURRENT MEDICATIONS  Home Medications     Reviewed by Nell Muller R.N. (Registered Nurse) on 05/14/22 at 0606  Med List Status: Not Addressed   Medication Last Dose Status        Patient Dave Taking any Medications                        ALLERGIES  No Known Allergies     PHYSICAL EXAM  VITAL SIGNS: BP (!) 150/90   Pulse 60   Temp 36.8 °C (98.2 °F) (Temporal)   Resp 16   Ht 1.905 m (6' 3\")   Wt (!) 125 kg (276 lb 0.3 " oz)   SpO2 98%   BMI 34.50 kg/m²  @MARK[[id number]::@     Pulse ox interpretation: 98% I interpret this pulse ox as normal     Constitutional: Well developed, well nourished, alert in no apparent distress, nontoxic appearance    HENT: No external signs of trauma, normocephalic, oropharynx moist, dental decay left lower molar with tenderness to palpation, no gingival swelling/erythema/drainage, no trismus/drooling/stridor, airway is widely patent and patient speaking with normal voice without difficulty, nose normal    Eyes: PERRL, conjunctiva without erythema, no discharge, no icterus    Neck: Soft and supple, trachea midline, no stridor, no tenderness/fullness/crepitus, no LAD, no JVD, good ROM    Thorax & Lungs: No respiratory distress  Extremities: No cyanosis, no edema, no gross deformity, good ROM  Skin: Warm, dry, no pallor/cyanosis, no rash noted    Lymphatic: No lymphadenopathy noted    Neuro: A/O times 3, no focal deficits noted, ambulating without difficulty  Psychiatric: Cooperative, normal mood and affect, normal judgement, appropriate for clinical situation        DIAGNOSTIC STUDIES / PROCEDURES    Patient gave verbal consent for dental block after risks/benefits/indications explained.  Patient was positioned for optimal exposure.  Landmarks were identified. Left inferior posterior alveolar block using 10 mL of 0.5% bupivacaine with epinephrine was performed without complication.        LABS  All labs reviewed by me.     Results for orders placed or performed during the hospital encounter of 02/24/22   CBC WITH DIFFERENTIAL   Result Value Ref Range    WBC 17.6 (H) 4.8 - 10.8 K/uL    RBC 5.30 4.70 - 6.10 M/uL    Hemoglobin 15.4 14.0 - 18.0 g/dL    Hematocrit 41.5 (L) 42.0 - 52.0 %    MCV 78.3 (L) 81.4 - 97.8 fL    MCH 29.1 27.0 - 33.0 pg    MCHC 37.1 (H) 33.7 - 35.3 g/dL    RDW 36.2 35.9 - 50.0 fL    Platelet Count 302 164 - 446 K/uL    MPV 11.2 9.0 - 12.9 fL    Neutrophils-Polys 84.90 (H) 44.00 - 72.00 %     Lymphocytes 8.10 (L) 22.00 - 41.00 %    Monocytes 6.10 0.00 - 13.40 %    Eosinophils 0.10 0.00 - 6.90 %    Basophils 0.30 0.00 - 1.80 %    Immature Granulocytes 0.50 0.00 - 0.90 %    Nucleated RBC 0.00 /100 WBC    Neutrophils (Absolute) 14.97 (H) 1.82 - 7.42 K/uL    Lymphs (Absolute) 1.43 1.00 - 4.80 K/uL    Monos (Absolute) 1.08 (H) 0.00 - 0.85 K/uL    Eos (Absolute) 0.02 0.00 - 0.51 K/uL    Baso (Absolute) 0.05 0.00 - 0.12 K/uL    Immature Granulocytes (abs) 0.08 0.00 - 0.11 K/uL    NRBC (Absolute) 0.00 K/uL   COMP METABOLIC PANEL   Result Value Ref Range    Sodium 134 (L) 135 - 145 mmol/L    Potassium 3.9 3.6 - 5.5 mmol/L    Chloride 98 96 - 112 mmol/L    Co2 24 20 - 33 mmol/L    Anion Gap 12.0 7.0 - 16.0    Glucose 136 (H) 65 - 99 mg/dL    Bun 16 8 - 22 mg/dL    Creatinine 1.05 0.50 - 1.40 mg/dL    Calcium 9.9 8.5 - 10.5 mg/dL    AST(SGOT) 29 12 - 45 U/L    ALT(SGPT) 24 2 - 50 U/L    Alkaline Phosphatase 79 30 - 99 U/L    Total Bilirubin 1.4 0.1 - 1.5 mg/dL    Albumin 4.8 3.2 - 4.9 g/dL    Total Protein 8.8 (H) 6.0 - 8.2 g/dL    Globulin 4.0 (H) 1.9 - 3.5 g/dL    A-G Ratio 1.2 g/dL   LIPASE   Result Value Ref Range    Lipase 44 11 - 82 U/L   URINALYSIS    Specimen: Urine   Result Value Ref Range    Color DK Yellow     Character Clear     Specific Gravity 1.033 <1.035    Ph 5.0 5.0 - 8.0    Glucose Negative Negative mg/dL    Ketones Trace (A) Negative mg/dL    Protein Negative Negative mg/dL    Bilirubin Negative Negative    Urobilinogen, Urine 0.2 Negative    Nitrite Negative Negative    Leukocyte Esterase Negative Negative    Occult Blood Negative Negative    Micro Urine Req see below    ESTIMATED GFR   Result Value Ref Range    GFR If African American >60 >60 mL/min/1.73 m 2    GFR If Non African American >60 >60 mL/min/1.73 m 2        RADIOLOGY  The radiologist's interpretation of all radiological studies have been reviewed by me.     No orders to display          COURSE & MEDICAL DECISION MAKING  Nursing  notes, VS, PMSFHx reviewed in chart.     Review of past medical records shows the patient was last seen in this ED February 24, 2022 for abdominal pain.      Differential diagnoses considered include but are not limited to: Dental caries, dental fracture/avulsion, abscess, gingivitis, periodontitis, stomatitis       History and physical exam as above.  Patient with dental pain due to apparent dental decay.  Dental block was performed as above without complications and with improvement of his pain.  At this point, I have low clinical suspicion for emergent pathology such as sepsis, meningitis, pharyngeal abscess, epiglottitis, ANUG, Kvng's angina given the history/exam/findings.  He will be prescribed amoxicillin.  He also requested a prescription for 800 mg ibuprofen which will be given.  He was advised on keeping his appointment with dentist on Wednesday.  Return to ED precautions were given.  Patient also noted to have elevated blood pressure reading for which he can follow-up on outpatient basis for further management.  Patient verbalized understanding and agreed with plan of care with no further questions or concerns.      The patient is referred to a primary physician for blood pressure management, diabetic screening, and for all other preventative health concerns.       FINAL IMPRESSION  1. Pain, dental Acute          DISPOSITION  Patient will be discharged home in stable condition.       FOLLOW UP  Please follow up with dentist [facility], Emergency Dept  [address] [facility] [address] [phone]    If symptoms worsen         OUTPATIENT MEDICATIONS  Discharge Medication List as of 5/14/2022  6:58 AM      START taking these medications    Details   amoxicillin (AMOXIL) 500 MG Cap Take 1 Capsule by mouth in the morning and 1 Capsule at noon and 1 Capsule in the evening. Do all this for 7 days., Disp-21 Capsule, R-0, Print Rx Paper      ibuprofen (MOTRIN) 800 MG  Tab Take 1 Tablet by mouth as needed in the morning and 1 Tablet as needed at noon and 1 Tablet as needed in the evening for Moderate Pain., Disp-30 Tablet, R-0, Print Rx Paper                Electronically signed by: Yannick Alcantar D.O., 5/14/2022 6:22 AM      Portions of this record were made with voice recognition software.  Despite my review, spelling/grammar/context errors may still remain.  Interpretation of this chart should be taken in this context.

## 2022-05-14 NOTE — ED TRIAGE NOTES
"Chief Complaint   Patient presents with   • Tooth Ache     Pt reports L lower tooth ache. Pt has dentist apt on Wednesday, but states he can't wait that long. Pt has been taking ibuprofen and Tylenol with no relief        Pt to triage with steady gait for above complaint.     Pt presents in triage with L lower wisdom tooth ache. Pt has been taking ibuprofen and Acetaminophen for relief and reports it \"isn't cutting it anymore\". Pt has dentist apt on Wednesday.    Pt back to lobby, educated on triage process and encourage to alert staff of any changes.     BP (!) 150/90   Pulse 60   Temp 36.8 °C (98.2 °F) (Temporal)   Resp 16   Ht 1.905 m (6' 3\")   Wt (!) 125 kg (276 lb 0.3 oz)   SpO2 98%   BMI 34.50 kg/m²     "

## 2022-05-21 ENCOUNTER — HOSPITAL ENCOUNTER (EMERGENCY)
Facility: MEDICAL CENTER | Age: 32
End: 2022-05-21
Attending: EMERGENCY MEDICINE
Payer: COMMERCIAL

## 2022-05-21 VITALS
BODY MASS INDEX: 34.05 KG/M2 | DIASTOLIC BLOOD PRESSURE: 91 MMHG | HEART RATE: 58 BPM | SYSTOLIC BLOOD PRESSURE: 147 MMHG | TEMPERATURE: 98.2 F | OXYGEN SATURATION: 98 % | RESPIRATION RATE: 16 BRPM | HEIGHT: 75 IN | WEIGHT: 273.81 LBS

## 2022-05-21 DIAGNOSIS — K02.9 PAIN DUE TO DENTAL CARIES: ICD-10-CM

## 2022-05-21 LAB
HIV 1+2 AB+HIV1 P24 AG SERPL QL IA: NORMAL
T PALLIDUM AB SER QL IA: NORMAL

## 2022-05-21 PROCEDURE — 86780 TREPONEMA PALLIDUM: CPT

## 2022-05-21 PROCEDURE — 36415 COLL VENOUS BLD VENIPUNCTURE: CPT

## 2022-05-21 PROCEDURE — A9270 NON-COVERED ITEM OR SERVICE: HCPCS | Performed by: EMERGENCY MEDICINE

## 2022-05-21 PROCEDURE — 700102 HCHG RX REV CODE 250 W/ 637 OVERRIDE(OP): Performed by: EMERGENCY MEDICINE

## 2022-05-21 PROCEDURE — 87491 CHLMYD TRACH DNA AMP PROBE: CPT

## 2022-05-21 PROCEDURE — 87591 N.GONORRHOEAE DNA AMP PROB: CPT

## 2022-05-21 PROCEDURE — 87389 HIV-1 AG W/HIV-1&-2 AB AG IA: CPT

## 2022-05-21 PROCEDURE — 99283 EMERGENCY DEPT VISIT LOW MDM: CPT

## 2022-05-21 RX ORDER — HYDROCODONE BITARTRATE AND ACETAMINOPHEN 5; 325 MG/1; MG/1
1 TABLET ORAL EVERY 6 HOURS PRN
Qty: 12 TABLET | Refills: 0 | Status: SHIPPED | OUTPATIENT
Start: 2022-05-21 | End: 2022-05-24

## 2022-05-21 RX ORDER — AMOXICILLIN AND CLAVULANATE POTASSIUM 875; 125 MG/1; MG/1
1 TABLET, FILM COATED ORAL ONCE
Status: COMPLETED | OUTPATIENT
Start: 2022-05-21 | End: 2022-05-21

## 2022-05-21 RX ORDER — HYDROCODONE BITARTRATE AND ACETAMINOPHEN 5; 325 MG/1; MG/1
1 TABLET ORAL ONCE
Status: COMPLETED | OUTPATIENT
Start: 2022-05-21 | End: 2022-05-21

## 2022-05-21 RX ORDER — IBUPROFEN 600 MG/1
600 TABLET ORAL EVERY 6 HOURS PRN
Qty: 30 TABLET | Refills: 0 | Status: SHIPPED | OUTPATIENT
Start: 2022-05-21

## 2022-05-21 RX ORDER — AMOXICILLIN AND CLAVULANATE POTASSIUM 875; 125 MG/1; MG/1
1 TABLET, FILM COATED ORAL 2 TIMES DAILY
Qty: 10 TABLET | Refills: 0 | Status: SHIPPED | OUTPATIENT
Start: 2022-05-21

## 2022-05-21 RX ADMIN — HYDROCODONE BITARTRATE AND ACETAMINOPHEN 1 TABLET: 5; 325 TABLET ORAL at 20:35

## 2022-05-21 RX ADMIN — AMOXICILLIN AND CLAVULANATE POTASSIUM 1 TABLET: 875; 125 TABLET, FILM COATED ORAL at 20:35

## 2022-05-21 ASSESSMENT — PAIN DESCRIPTION - PAIN TYPE: TYPE: ACUTE PAIN

## 2022-05-21 ASSESSMENT — FIBROSIS 4 INDEX: FIB4 SCORE: 0.63

## 2022-05-22 LAB
C TRACH DNA SPEC QL NAA+PROBE: NEGATIVE
N GONORRHOEA DNA SPEC QL NAA+PROBE: NEGATIVE
SPECIMEN SOURCE: NORMAL

## 2022-05-22 NOTE — ED PROVIDER NOTES
ED Provider Note    Scribed for Ruben Jeffers M.D. by Darion Coronado. 5/21/2022, 7:54 PM.    Primary care provider: Edy Eubanks D.O.  Means of arrival: Walk in  History obtained from: Patient  History limited by: None    CHIEF COMPLAINT  Chief Complaint   Patient presents with    Tooth Ache     Pt reports he was in last week for same complaint. Pt reports it is his wisdom tooth on bottom left side. Pain is 7/10. Pt reports he won't be having the tooth taken out until the end of June.        HPI  Nancy Valerio is a 31 y.o. male who presents to the Emergency Department for evaluation of a back left toothache onset last week. Patient endorses associated inability to sleep and lost voice, but denies any fevers, chills, or dysphagia. Patient states his lost voice may be due to his job and not his tooth. He notes he came here last week and was given pain medications to help with his pain. At that time, the patient was informed his symptoms were caused by a nerve. He notes the soonest available appointment to extract his tooth was on June 31st. Patient states he has braces but not on the tooth of concern.    REVIEW OF SYSTEMS  Pertinent positives include back left toothache, lost voice, and inability to sleep. Pertinent negatives include fevers, chills, or dysphagia.   E    PAST MEDICAL HISTORY   None noted when reviewed    SURGICAL HISTORY  patient denies any surgical history    SOCIAL HISTORY  Social History     Tobacco Use    Smoking status: Never Smoker    Smokeless tobacco: Never Used   Vaping Use    Vaping Use: Never used   Substance Use Topics    Alcohol use: Yes     Comment: socially    Drug use: Yes     Comment: occasional marijuana      Social History     Substance and Sexual Activity   Drug Use Yes    Comment: occasional marijuana       FAMILY HISTORY  History reviewed. No pertinent family history.    CURRENT MEDICATIONS  Home Medications       Reviewed by Elida Lopez R.N.  "(Registered Nurse) on 05/21/22 at 1948  Med List Status: Partial     Medication Last Dose Status   amoxicillin (AMOXIL) 500 MG Cap  Active   ibuprofen (MOTRIN) 800 MG Tab  Active                    ALLERGIES  No Known Allergies    PHYSICAL EXAM  VITAL SIGNS: /82   Pulse 63   Temp 36.1 °C (97 °F) (Temporal)   Resp 16   Ht 1.905 m (6' 3\")   Wt 124 kg (273 lb 13 oz)   SpO2 99% Comment: RA  BMI 34.22 kg/m²     Pulse ox interpretation: I interpret this pulse ox as normal.  Constitutional: Alert in no apparent distress.  HENT: Normocephalic, Atraumatic, Bilateral external ears normal. Nose normal. Dental sudeep to left lower wisdom tooth tooth. No obvious abscess. No lymphadenopathy.  No signs of Theodore's angina  Eyes: Pupils are equal and reactive. Conjunctiva normal, non-icteric.   Heart: Regular rate and rythm, no murmurs.    Lungs: Clear to auscultation bilaterally.  Skin: Warm, Dry, No erythema, No rash.   Neurologic: Alert, Grossly non-focal.   Psychiatric: Affect normal, Judgment normal, Mood normal, Appears appropriate and not intoxicated.     COURSE & MEDICAL DECISION MAKING  Nursing notes, VS, PMSFHx reviewed in chart.    7:54 PM - Patient seen and examined at bedside. Patient will be treated with Norco 5-325 mg and Augmentin 875-125 mg.  Ordered Chlamydia/GC, T. Pallidum AB EIA, and HIV AG/AB. to evaluate his symptoms. Instructed the patient to follow up with oral surgery tomorrow morning. Discussed plans for discharge and return precautions. Patient verbalizes understanding and agreement to this plan of care. Patient was prescribed Norco, Augmentin, and Motrin.    Medical Decision Making: Patient presents with dental pain this appears to be secondary to dental caries in his wisdom tooth.  At this point time there is no drainable abscess.  I will start the patient on Augmentin and give him a short course of Norco for the pain.  He can take ibuprofen during the day while he is at work.    I " reviewed prescription monitoring program for patient's narcotic use before prescribing a scheduled drug.The patient will not drink alcohol nor drive with prescribed medications. The patient will return for new or worsening symptoms and is stable at the time of discharge.    The patient is referred to a primary physician for blood pressure management, diabetic screening, and for all other preventative health concerns.    In prescribing controlled substances to this patient, I certify that I have obtained and reviewed the medical history of Nancy Valerio. I have also made a good apoorva effort to obtain applicable records from other providers who have treated the patient and records did not demonstrate any increased risk of substance abuse that would prevent me from prescribing controlled substances.     I have conducted a physical exam and documented it. I have reviewed Mr. Valerio’s prescription history as maintained by the [place] Prescription Monitoring Program.     I have assessed the patient’s risk for abuse, dependency, and addiction using the validated Opioid Risk Tool available at https://www.mdcalc.com/dtbymy-fqdr-sxxy-ort-narcotic-abuse.     Given the above, I believe the benefits of controlled substance therapy outweigh the risks. The reasons for prescribing controlled substances include non-narcotic, oral analgesic alternatives have been inadequate for pain control. Accordingly, I have discussed the risk and benefits, treatment plan, and alternative therapies with the patient.        DISPOSITION:  Patient will be discharged home in stable condition.    FOLLOW UP:  Taj Cantor D.D.S.  [address] [place] [id number]  [phone]    Schedule an appointment as soon as possible for a visit in 3 days        OUTPATIENT MEDICATIONS:  New Prescriptions    AMOXICILLIN-CLAVULANATE (AUGMENTIN) 875-125 MG TAB    Take 1 Tablet by mouth 2 times a day.    HYDROCODONE-ACETAMINOPHEN (NORCO) 5-325 MG  TAB PER TABLET    Take 1 Tablet by mouth every 6 hours as needed (Severe pain) for up to 3 days.    IBUPROFEN (MOTRIN) 600 MG TAB    Take 1 Tablet by mouth every 6 hours as needed for Moderate Pain.         FINAL IMPRESSION  1. Pain due to dental caries          Darion TSAI (Mikeibjia), am scribing for, and in the presence of, Ruben Jeffers M.D.    Electronically signed by: Darion Coronado (Derrick), 5/21/2022    Ruben TSAI M.D. personally performed the services described in this documentation, as scribed by Darion Coronado in my presence, and it is both accurate and complete.    The note accurately reflects work and decisions made by me.  Ruben Jeffers M.D.  5/22/2022  1:47 AM

## 2022-05-22 NOTE — ED TRIAGE NOTES
"Chief Complaint   Patient presents with   • Tooth Ache     Pt reports he was in last week for same complaint. Pt reports it is his wisdom tooth on bottom left side. Pain is 7/10. Pt reports he won't be having the tooth taken out until the end of June.        32 yo male to triage for above complaint. Pt would like to be screened for STIs at this time. GCS=15.     Pt is alert and oriented, speaking in full sentences, follows commands and responds appropriately to questions. NAD. Resp are even and unlabored.      Pt placed in lobby. Pt educated on triage process. Pt encouraged to alert staff for any changes.     Patient and staff wearing appropriate PPE    /82   Pulse 63   Temp 36.1 °C (97 °F) (Temporal)   Resp 16   Ht 1.905 m (6' 3\")   Wt 124 kg (273 lb 13 oz)   SpO2 99% Comment: RA  BMI 34.22 kg/m²     "

## 2022-05-22 NOTE — DISCHARGE INSTRUCTIONS
Return the emergency department if you have difficulty swallowing, difficulty breathing, facial swelling or fever that will not go down Tylenol or ibuprofen.  Take all your antibiotics until gone.

## 2022-05-22 NOTE — ED NOTES
Discharge instructions provided to pt.  ERP went over discharge instructions with pt.  Pt instructed to follow up with oral surgeon and to  prescriptions from pharmacy.  Pt verbalized understanding.  Instructed to return to Emergency Department for new or worsening symptoms.

## 2022-06-16 ENCOUNTER — HOSPITAL ENCOUNTER (EMERGENCY)
Facility: MEDICAL CENTER | Age: 32
End: 2022-06-16
Attending: EMERGENCY MEDICINE
Payer: COMMERCIAL

## 2022-06-16 VITALS
WEIGHT: 249.34 LBS | SYSTOLIC BLOOD PRESSURE: 130 MMHG | HEART RATE: 48 BPM | HEIGHT: 75 IN | BODY MASS INDEX: 31 KG/M2 | RESPIRATION RATE: 16 BRPM | TEMPERATURE: 97.1 F | DIASTOLIC BLOOD PRESSURE: 73 MMHG | OXYGEN SATURATION: 97 %

## 2022-06-16 DIAGNOSIS — K02.9 DENTAL CARIES: ICD-10-CM

## 2022-06-16 PROCEDURE — 99282 EMERGENCY DEPT VISIT SF MDM: CPT

## 2022-06-16 RX ORDER — AMOXICILLIN 500 MG/1
500 CAPSULE ORAL 3 TIMES DAILY
Qty: 21 CAPSULE | Refills: 0 | Status: SHIPPED | OUTPATIENT
Start: 2022-06-16 | End: 2022-06-23

## 2022-06-16 ASSESSMENT — FIBROSIS 4 INDEX: FIB4 SCORE: 0.63

## 2022-06-16 NOTE — ED PROVIDER NOTES
"ED Provider Note    CHIEF COMPLAINT  Chief Complaint   Patient presents with   • Dental Pain       HPI  Nancy Valerio is a 31 y.o. male who presents complaining of toothache.  It hurts left low.  It has been present for months.  Pain radiates locally.  Rates pain as moderate to severe.  It is worse to chew or bite down.  However, patient is able to take orals.  No associated breathing/swallowing difficulty.  There is no fever, there is no facial swelling.   He was seen here before and given antibiotics and pain medication but has ran out of the pain medication.  He has an appointment for the end of this month denies  ROS:  Positive for dental pain, Negative for fever or swelling    PAST MEDICAL HISTORY     Denies  FAMILY HISTORY  History reviewed. No pertinent family history.    SOCIAL HISTORY  Social History     Tobacco Use   • Smoking status: Never Smoker   • Smokeless tobacco: Never Used   Vaping Use   • Vaping Use: Never used   Substance and Sexual Activity   • Alcohol use: Yes     Comment: socially   • Drug use: Yes     Comment: occasional marijuana   • Sexual activity: Not on file       SURGICAL HISTORY  patient denies any surgical history    CURRENT MEDICATIONS  Reviewed, see encounter summary    ALLERGIES  No Known Allergies    REVIEW OF SYSTEMS  See HPI for further details. Review of systems as above, otherwise all other systems are negative.     PHYSICAL EXAM  VITAL SIGNS: /83   Pulse (!) 57   Temp 36.4 °C (97.5 °F) (Temporal)   Resp 16   Ht 1.905 m (6' 3\")   Wt 113 kg (249 lb 5.4 oz)   SpO2 95%   BMI 31.17 kg/m²     Constitutional: Well appearing patient in mild distress from pain.  Not toxic, nor ill in appearance.  HENT: Mucus membranes moist.  Oropharynx is clear.  There are scattered caries.  There is no facial edema.  Left posterior molar has a dental cavity, he has braces intact, most of his teeth have debris within the degree of braces.  Tongue is normal.  Floor of the " mouth is normal.  Submental space is soft.  Posterior pharynx is normal.  Patient is tolerating secretions without difficulty.  Eyes: Pupils equally round.    Neck: Full nontender range of motion; no meningismus.   Lymphatic: No cervical lymphadenopathy noted.   Skin: No purpura nor petechia noted.  Extremities/Musculoskeletal: No sign of trauma.  Psychiatric: Normal affect appropriate for the clinical situation.    COURSE & MEDICAL DECISION MAKING  I have reviewed the recorded medical record information.    This patient presents with a toothache.  There is no obvious dental abscess at this time which I can drain.  I am prescribing the patient pain pills and antibiotics.  They are asked to follow up with a dentist at soonest possibility at the end of this month asing pain or swelling, breathing/swallowing difficulty, fever, any other concern at all.  They are given aftercare instructions on toothache,        DISPOSITION:  Patient will be discharged home in stable condition.    OUTPATIENT MEDICATIONS:  New Prescriptions    AMOXICILLIN (AMOXIL) 500 MG CAP    Take 1 Capsule by mouth 3 times a day for 7 days.    ETODOLAC (LODINE) 300 MG CAP    Take 1 Capsule by mouth 2 times a day.       FINAL IMPRESSION  1. Acute toothache  2. Dental caries    Electronically signed by: Lois Rand M.D., 6/16/2022 2:09 PM

## 2022-06-16 NOTE — ED TRIAGE NOTES
"Pt ambulatory to triage c/o dental pain on left lower side. States \"I have a wisdom tooth that needs to come out and I have an appt later this month for this\" nad  "

## 2022-06-16 NOTE — ED NOTES
Pt self ambulated to room with out difficulty. Pt is sitting up right in chair with no needs at the time. Chart up for ERP.

## 2022-06-16 NOTE — ED NOTES
Pt sitting up right in chair in no obvious distress. Discharge information and instructions given/discussed with Pt. Pt acknowledged and understood information and instructions. Pt has no question at this time. Pt self ambulated out to [facility] for ride.